# Patient Record
Sex: FEMALE | Race: WHITE | NOT HISPANIC OR LATINO | Employment: FULL TIME | URBAN - METROPOLITAN AREA
[De-identification: names, ages, dates, MRNs, and addresses within clinical notes are randomized per-mention and may not be internally consistent; named-entity substitution may affect disease eponyms.]

---

## 2023-04-08 DIAGNOSIS — K21.9 GASTROESOPHAGEAL REFLUX DISEASE WITHOUT ESOPHAGITIS: Primary | ICD-10-CM

## 2023-04-10 PROBLEM — K21.9 GERD (GASTROESOPHAGEAL REFLUX DISEASE): Status: ACTIVE | Noted: 2023-04-10

## 2023-04-10 RX ORDER — OMEPRAZOLE 40 MG/1
40 CAPSULE, DELAYED RELEASE ORAL DAILY
COMMUNITY
End: 2023-04-26 | Stop reason: SDUPTHER

## 2023-04-10 NOTE — TELEPHONE ENCOUNTER
I called pt and left a message for pt to call back   She is due for her physical after 7/25/2023  Please schedule with [facility]

## 2023-04-18 RX ORDER — OMEPRAZOLE 40 MG/1
CAPSULE, DELAYED RELEASE ORAL
Qty: 30 CAPSULE | Refills: 0 | Status: SHIPPED | OUTPATIENT
Start: 2023-04-18

## 2023-04-18 NOTE — TELEPHONE ENCOUNTER
Attempted to contact pt multiple times to schedule an appointment for refill. Please decline refill request until appointment is scheduled.

## 2023-04-24 PROBLEM — M89.8X9 BONE PAIN: Status: ACTIVE | Noted: 2023-04-24

## 2023-04-24 PROBLEM — R07.9 CHEST PAIN: Status: ACTIVE | Noted: 2023-04-24

## 2023-04-24 PROBLEM — E78.5 HYPERLIPIDEMIA: Status: ACTIVE | Noted: 2023-04-24

## 2023-04-24 PROBLEM — G47.00 INSOMNIA: Status: ACTIVE | Noted: 2023-04-24

## 2023-04-24 PROBLEM — C50.919 BREAST CANCER (MULTI): Status: ACTIVE | Noted: 2023-04-24

## 2023-04-24 RX ORDER — LETROZOLE 2.5 MG/1
2.5 TABLET, FILM COATED ORAL DAILY
COMMUNITY
Start: 2021-09-21

## 2023-04-24 RX ORDER — ACETAMINOPHEN 500 MG
500 TABLET ORAL DAILY PRN
COMMUNITY

## 2023-04-25 PROBLEM — C50.812 MALIGNANT NEOPLASM OF OVERLAPPING SITES OF LEFT FEMALE BREAST (MULTI): Status: ACTIVE | Noted: 2019-08-23

## 2023-04-25 RX ORDER — CIDER VINEGAR 300 MG
300 TABLET ORAL DAILY
COMMUNITY

## 2023-04-25 RX ORDER — LORATADINE 10 MG/1
TABLET ORAL
COMMUNITY
Start: 2020-08-12

## 2023-04-25 RX ORDER — IBUPROFEN 200 MG
200 TABLET ORAL AS NEEDED
COMMUNITY

## 2023-04-26 ENCOUNTER — LAB (OUTPATIENT)
Dept: LAB | Facility: LAB | Age: 48
End: 2023-04-26
Payer: COMMERCIAL

## 2023-04-26 ENCOUNTER — OFFICE VISIT (OUTPATIENT)
Dept: PRIMARY CARE | Facility: CLINIC | Age: 48
End: 2023-04-26
Payer: COMMERCIAL

## 2023-04-26 VITALS
BODY MASS INDEX: 26.72 KG/M2 | RESPIRATION RATE: 12 BRPM | HEART RATE: 65 BPM | TEMPERATURE: 97.2 F | HEIGHT: 64 IN | WEIGHT: 156.5 LBS | DIASTOLIC BLOOD PRESSURE: 69 MMHG | SYSTOLIC BLOOD PRESSURE: 111 MMHG | OXYGEN SATURATION: 98 %

## 2023-04-26 DIAGNOSIS — Z11.59 NEED FOR HEPATITIS B SCREENING TEST: ICD-10-CM

## 2023-04-26 DIAGNOSIS — K21.9 GASTROESOPHAGEAL REFLUX DISEASE WITHOUT ESOPHAGITIS: Primary | ICD-10-CM

## 2023-04-26 DIAGNOSIS — Z00.00 WELLNESS EXAMINATION: ICD-10-CM

## 2023-04-26 DIAGNOSIS — R10.84 GENERALIZED ABDOMINAL PAIN: ICD-10-CM

## 2023-04-26 DIAGNOSIS — Z11.59 NEED FOR HEPATITIS C SCREENING TEST: ICD-10-CM

## 2023-04-26 DIAGNOSIS — R10.2 SUPRAPUBIC ABDOMINAL PAIN: ICD-10-CM

## 2023-04-26 DIAGNOSIS — Z11.4 ENCOUNTER FOR SCREENING FOR HIV: ICD-10-CM

## 2023-04-26 DIAGNOSIS — Z17.0 MALIGNANT NEOPLASM OF OVERLAPPING SITES OF LEFT BREAST IN FEMALE, ESTROGEN RECEPTOR POSITIVE (MULTI): ICD-10-CM

## 2023-04-26 DIAGNOSIS — C50.812 MALIGNANT NEOPLASM OF OVERLAPPING SITES OF LEFT BREAST IN FEMALE, ESTROGEN RECEPTOR POSITIVE (MULTI): ICD-10-CM

## 2023-04-26 DIAGNOSIS — R14.0 ABDOMINAL BLOATING: ICD-10-CM

## 2023-04-26 DIAGNOSIS — E78.2 MIXED HYPERLIPIDEMIA: ICD-10-CM

## 2023-04-26 PROBLEM — M89.8X9 BONE PAIN: Status: RESOLVED | Noted: 2023-04-24 | Resolved: 2023-04-26

## 2023-04-26 PROBLEM — C50.919 BREAST CANCER (MULTI): Status: RESOLVED | Noted: 2023-04-24 | Resolved: 2023-04-26

## 2023-04-26 PROBLEM — G47.00 INSOMNIA: Status: RESOLVED | Noted: 2023-04-24 | Resolved: 2023-04-26

## 2023-04-26 PROBLEM — R07.9 CHEST PAIN: Status: RESOLVED | Noted: 2023-04-24 | Resolved: 2023-04-26

## 2023-04-26 LAB
ALANINE AMINOTRANSFERASE (SGPT) (U/L) IN SER/PLAS: 69 U/L (ref 7–45)
ALBUMIN (G/DL) IN SER/PLAS: 4.7 G/DL (ref 3.4–5)
ALKALINE PHOSPHATASE (U/L) IN SER/PLAS: 85 U/L (ref 33–110)
AMYLASE (U/L) IN SER/PLAS: 26 U/L (ref 29–103)
ANION GAP IN SER/PLAS: 12 MMOL/L (ref 10–20)
APPEARANCE, URINE: CLEAR
ASPARTATE AMINOTRANSFERASE (SGOT) (U/L) IN SER/PLAS: 48 U/L (ref 9–39)
BILIRUBIN TOTAL (MG/DL) IN SER/PLAS: 0.5 MG/DL (ref 0–1.2)
BILIRUBIN, URINE: NEGATIVE
BLOOD, URINE: NEGATIVE
CALCIUM (MG/DL) IN SER/PLAS: 9.8 MG/DL (ref 8.6–10.6)
CARBON DIOXIDE, TOTAL (MMOL/L) IN SER/PLAS: 29 MMOL/L (ref 21–32)
CHLORIDE (MMOL/L) IN SER/PLAS: 102 MMOL/L (ref 98–107)
CHOLESTEROL (MG/DL) IN SER/PLAS: 225 MG/DL (ref 0–199)
CHOLESTEROL IN HDL (MG/DL) IN SER/PLAS: 71.2 MG/DL
CHOLESTEROL/HDL RATIO: 3.2
COLOR, URINE: YELLOW
CREATININE (MG/DL) IN SER/PLAS: 0.77 MG/DL (ref 0.5–1.05)
ERYTHROCYTE DISTRIBUTION WIDTH (RATIO) BY AUTOMATED COUNT: 12.8 % (ref 11.5–14.5)
ERYTHROCYTE MEAN CORPUSCULAR HEMOGLOBIN CONCENTRATION (G/DL) BY AUTOMATED: 32.3 G/DL (ref 32–36)
ERYTHROCYTE MEAN CORPUSCULAR VOLUME (FL) BY AUTOMATED COUNT: 93 FL (ref 80–100)
ERYTHROCYTES (10*6/UL) IN BLOOD BY AUTOMATED COUNT: 4.9 X10E12/L (ref 4–5.2)
GFR FEMALE: >90 ML/MIN/1.73M2
GLUCOSE (MG/DL) IN SER/PLAS: 91 MG/DL (ref 74–99)
GLUCOSE, URINE: NEGATIVE MG/DL
HEMATOCRIT (%) IN BLOOD BY AUTOMATED COUNT: 45.8 % (ref 36–46)
HEMOGLOBIN (G/DL) IN BLOOD: 14.8 G/DL (ref 12–16)
HEPATITIS B VIRUS SURFACE AB (MIU/ML) IN SERUM: <3.1 MIU/ML
HEPATITIS C VIRUS AB PRESENCE IN SERUM: NONREACTIVE
HIV 1/ 2 AG/AB SCREEN: NONREACTIVE
KETONES, URINE: NEGATIVE MG/DL
LDL: 136 MG/DL (ref 0–99)
LEUKOCYTE ESTERASE, URINE: NEGATIVE
LEUKOCYTES (10*3/UL) IN BLOOD BY AUTOMATED COUNT: 4.7 X10E9/L (ref 4.4–11.3)
LIPASE (U/L) IN SER/PLAS: 34 U/L (ref 9–82)
NITRITE, URINE: NEGATIVE
NRBC (PER 100 WBCS) BY AUTOMATED COUNT: 0 /100 WBC (ref 0–0)
PH, URINE: 6 (ref 5–8)
PLATELETS (10*3/UL) IN BLOOD AUTOMATED COUNT: 203 X10E9/L (ref 150–450)
POTASSIUM (MMOL/L) IN SER/PLAS: 4.1 MMOL/L (ref 3.5–5.3)
PROTEIN TOTAL: 7.6 G/DL (ref 6.4–8.2)
PROTEIN, URINE: NEGATIVE MG/DL
SODIUM (MMOL/L) IN SER/PLAS: 139 MMOL/L (ref 136–145)
SPECIFIC GRAVITY, URINE: 1.02 (ref 1–1.03)
TRIGLYCERIDE (MG/DL) IN SER/PLAS: 90 MG/DL (ref 0–149)
UREA NITROGEN (MG/DL) IN SER/PLAS: 14 MG/DL (ref 6–23)
UROBILINOGEN, URINE: <2 MG/DL (ref 0–1.9)
VLDL: 18 MG/DL (ref 0–40)

## 2023-04-26 PROCEDURE — 80061 LIPID PANEL: CPT

## 2023-04-26 PROCEDURE — 80053 COMPREHEN METABOLIC PANEL: CPT

## 2023-04-26 PROCEDURE — 83690 ASSAY OF LIPASE: CPT

## 2023-04-26 PROCEDURE — 85027 COMPLETE CBC AUTOMATED: CPT

## 2023-04-26 PROCEDURE — 87389 HIV-1 AG W/HIV-1&-2 AB AG IA: CPT

## 2023-04-26 PROCEDURE — 86706 HEP B SURFACE ANTIBODY: CPT

## 2023-04-26 PROCEDURE — 86803 HEPATITIS C AB TEST: CPT

## 2023-04-26 PROCEDURE — 36415 COLL VENOUS BLD VENIPUNCTURE: CPT

## 2023-04-26 PROCEDURE — 99396 PREV VISIT EST AGE 40-64: CPT | Performed by: FAMILY MEDICINE

## 2023-04-26 PROCEDURE — 82150 ASSAY OF AMYLASE: CPT

## 2023-04-26 PROCEDURE — 81003 URINALYSIS AUTO W/O SCOPE: CPT

## 2023-04-26 PROCEDURE — 1036F TOBACCO NON-USER: CPT | Performed by: FAMILY MEDICINE

## 2023-04-26 RX ORDER — PLANT STANOL ESTER 450 MG
TABLET ORAL
COMMUNITY

## 2023-04-26 RX ORDER — NAPROXEN SODIUM 220 MG/1
1 TABLET ORAL DAILY
COMMUNITY

## 2023-04-26 NOTE — ASSESSMENT & PLAN NOTE
Encouraged to check records about tetanus vaccine.  Ordered hepatitis B titer test.  Ordered general wellness blood work.    Hearing test:  Recommended to have hearing test to assess hearing loss.  Discussed that untreated hearing loss increases risk for dementia.  Referred to audiologist.

## 2023-04-26 NOTE — PROGRESS NOTES
"Subjective   Patient ID: Dipti Patino is a 48 y.o. female who presents for annual physical/wellness visit.    She signed document informing that if problem issues also address at today's wellness visit that insurance may be appropriately billed so co-pay and deductible out of pocket expenses may occur.    Colorectal cancer screen: 12/22/2020  Mammogram:  10/31/2022   Pap: per patient [facility] to [facility] ( [facility])   Last Menstrual Period:   12/2019   Tdap:  HIV screen:  Hepatitis C screen:  Hepatitis B vaccine:    HPI   Mammogram on left side showed probably benign probable postsurgical changes in the left mastectomy bed. She will be due for 6 months follow up. If follow up mammogram remains stable, she will return to yearly mammograms.     Patient c/o abdominal bloating and discomfort. Ongoing over the past 3 weeks. Symptoms have been variable. She describes them as similar to mild, dull menstrual cramps. She felt feeling of discomfort on the right side of abdomen that she states was similar to when she was 14-16 weeks pregnant. It lasted only one day. This episode occurred about 2 weeks ago. Endorses urinary urgency. Denies dysuria or change in bowels or nausea.     Patient endorses difficulty hearing in crowded spaces when there are multiple sources of sound.    Follows with dentist and optometrist regularly.    History of salpingectomy without uterus removal.    Review of Systems  constitutional: as per HPI  eyes:as per HPI  CV:as per HPI  Respiratory:as per HPI  GI:as per HPI  neuro:as per HPI  endo:as per HPI  heme/lymph:as per HPI     Objective   /69 (BP Location: Right arm, Patient Position: Sitting, BP Cuff Size: Adult)   Pulse 65   Temp 36.2 °C (97.2 °F) (Temporal)   Resp 12   Ht 1.626 m (5' 4\")   Wt 71 kg (156 lb 8 oz)   LMP  (LMP Unknown)   SpO2 98%   BMI 26.86 kg/m²     Physical Exam  Constitutional:       Appearance: Normal appearance. She is overweight.   HENT:      Head: " Normocephalic.      Right Ear: Tympanic membrane, ear canal and external ear normal.      Left Ear: Tympanic membrane, ear canal and external ear normal.   Eyes:      Conjunctiva/sclera: Conjunctivae normal.      Pupils: Pupils are equal, round, and reactive to light.   Cardiovascular:      Rate and Rhythm: Normal rate and regular rhythm.   Pulmonary:      Effort: Pulmonary effort is normal.      Breath sounds: Normal breath sounds.   Abdominal:      General: Abdomen is flat. Bowel sounds are normal.      Palpations: Abdomen is soft.      Tenderness: There is abdominal tenderness in the right lower quadrant and suprapubic area.   Musculoskeletal:         General: Normal range of motion.   Skin:     General: Skin is warm.   Neurological:      Mental Status: She is alert and oriented to person, place, and time.   Psychiatric:         Mood and Affect: Mood normal.         Behavior: Behavior normal.         Thought Content: Thought content normal.         Judgment: Judgment normal.         Assessment/Plan   Problem List Items Addressed This Visit          Digestive    Gastroesophageal reflux disease without esophagitis - Primary     Stable.  Continue omeprazole 40 mg daily.            Other    Mixed hyperlipidemia     Stable.  Continue plant stanol supplement.  Ordered lipid panel.         Malignant neoplasm of overlapping sites of left breast in female, estrogen receptor positive (CMS/HCC)     Currently treating with Femara 2.5 mg daily.  Recent mammogram showed left side probably benign probable postsurgical changes in the left mastectomy bed.  Will be due for repeat breast ultrasound after 6 months.         Wellness examination     Encouraged to check records about tetanus vaccine.  Ordered hepatitis B titer test.  Ordered general wellness blood work.    Hearing test:  Recommended to have hearing test to assess hearing loss.  Discussed that untreated hearing loss increases risk for dementia.  Referred to  "audiologist.          Other Visit Diagnoses       Encounter for screening for HIV        Need for hepatitis C screening test        Generalized abdominal pain        Ongoing for 3 weeks.  Urine screen completed today.  Ordered abdominal ultrasound.              Tips for your general wellness...;  Remember importance of daily aerobic exercise for 30minutes to help with both your physical and mental/emotional health.  ;  Take time twice a day to relax with focused breathing and relaxation.  A good source to learn \"mindfulness\" relaxation is a book \"Mindfulness for Beginners\" by Jakob Grover.  ;    Strive for healthy eating with plenty of water, fruits, vegetables, and choosing as much plant based diet as able  If do consume non plant protein, choose fish high in omega (like salmon or cod), skinless poultry, and rarely anything from a cow  Avoid processed foods.  ;  Shop the perimeter of the grocery store  - not the inner aisles where all the boxed, bagged, and canned process non natural foods are   Avoid sugar - including fruit juices with added sugar and avoid artificial sweeteners (sucralose, aspartame).; if want to use sweetener, use stevia (natural plant based non caloric sweetener)  Recommended guided nutrition plans include Mediterranean Diet - online resources available     Get plenty of sleep nightly - 7 hours minimum;    Exercise 150min per week;    Do not use tobacco    Abstain or limit alcohol to 1-2 drinks per 24 hours    See your dentist at least yearly    Have an eye check at least every 5 years    Follow up 1 year for annual wellness checkup;    By signing my name below IWade scribe, [place] that this documentation has been prepared under the direction and in the presence of Dr. Adarsh Rueda. 04/26/23   "

## 2023-04-26 NOTE — ASSESSMENT & PLAN NOTE
Oncologist Dr Oliver  Currently treating with Femara 2.5 mg daily.  Recent mammogram showed left side probably benign probable postsurgical changes in the left mastectomy bed with reduced size from prior   Will be due for repeat breast ultrasound after 6 months.

## 2023-04-28 ENCOUNTER — TELEPHONE (OUTPATIENT)
Dept: PRIMARY CARE | Facility: CLINIC | Age: 48
End: 2023-04-28
Payer: COMMERCIAL

## 2023-04-28 DIAGNOSIS — H91.90 HEARING LOSS, UNSPECIFIED HEARING LOSS TYPE, UNSPECIFIED LATERALITY: Primary | ICD-10-CM

## 2023-04-28 NOTE — TELEPHONE ENCOUNTER
Patient called requesting referral for audiology, states previously discussed at recent appt. Please return call at [phone].

## 2023-05-01 NOTE — TELEPHONE ENCOUNTER
Apologize for me that I forgot to do this at appointment as we discussed    Referral is now in     Send patricia message to schedule    Thanks

## 2023-05-01 NOTE — TELEPHONE ENCOUNTER
Pt calling wanted to let you know that on Friday afternoon she noticed that she was having blood in her urine and also spotting that has lasted over the weekend and today. She stated that it's like tissue looking on Sunday and today with abd cramps and low back like a menstrual cycle where she has to wear a pad.   Pt wanted you to be aware since she just had blood work done. Pt said ok to wait till you see this tomorrow.

## 2023-05-02 ENCOUNTER — TELEPHONE (OUTPATIENT)
Dept: PRIMARY CARE | Facility: CLINIC | Age: 48
End: 2023-05-02

## 2023-05-02 DIAGNOSIS — R10.2 PELVIC PAIN: Primary | ICD-10-CM

## 2023-05-02 DIAGNOSIS — N83.201 CYST OF RIGHT OVARY: ICD-10-CM

## 2023-05-02 DIAGNOSIS — N29 NEPHROCALCINOSIS: ICD-10-CM

## 2023-05-02 DIAGNOSIS — N93.9 ABNORMAL UTERINE BLEEDING: ICD-10-CM

## 2023-05-02 DIAGNOSIS — E83.59 NEPHROCALCINOSIS: ICD-10-CM

## 2023-05-02 DIAGNOSIS — R74.8 ELEVATED LIVER ENZYMES: ICD-10-CM

## 2023-05-02 NOTE — TELEPHONE ENCOUNTER
Labs not show anemia or any explainable cause for abd oain and now these symptoms    Ultrasound abd was ordered-  please confirm scheduled  As this is next test needed

## 2023-05-02 NOTE — TELEPHONE ENCOUNTER
The patient has been scheduled. She has questions about the abdomin imaging that was just done and what was seen with the Kidneys. She is asking if the provider could look at this and elaborate.  Please call her at her cellphone.

## 2023-05-23 ENCOUNTER — LAB (OUTPATIENT)
Dept: LAB | Facility: LAB | Age: 48
End: 2023-05-23
Payer: COMMERCIAL

## 2023-05-23 DIAGNOSIS — R74.8 ELEVATED LIVER ENZYMES: ICD-10-CM

## 2023-05-23 PROCEDURE — 80076 HEPATIC FUNCTION PANEL: CPT

## 2023-05-23 PROCEDURE — 36415 COLL VENOUS BLD VENIPUNCTURE: CPT

## 2023-05-24 LAB
ALANINE AMINOTRANSFERASE (SGPT) (U/L) IN SER/PLAS: 15 U/L (ref 7–45)
ALBUMIN (G/DL) IN SER/PLAS: 4.4 G/DL (ref 3.4–5)
ALKALINE PHOSPHATASE (U/L) IN SER/PLAS: 64 U/L (ref 33–110)
ASPARTATE AMINOTRANSFERASE (SGOT) (U/L) IN SER/PLAS: 20 U/L (ref 9–39)
BILIRUBIN DIRECT (MG/DL) IN SER/PLAS: 0.1 MG/DL (ref 0–0.3)
BILIRUBIN TOTAL (MG/DL) IN SER/PLAS: 0.7 MG/DL (ref 0–1.2)
PROTEIN TOTAL: 7.1 G/DL (ref 6.4–8.2)

## 2024-01-05 ENCOUNTER — PATIENT MESSAGE (OUTPATIENT)
Dept: PRIMARY CARE | Facility: CLINIC | Age: 49
End: 2024-01-05
Payer: COMMERCIAL

## 2024-01-05 DIAGNOSIS — R05.2 SUBACUTE COUGH: Primary | ICD-10-CM

## 2024-01-05 RX ORDER — BENZONATATE 100 MG/1
100 CAPSULE ORAL 3 TIMES DAILY PRN
Qty: 42 CAPSULE | Refills: 0 | Status: SHIPPED | OUTPATIENT
Start: 2024-01-05 | End: 2024-02-04

## 2024-05-01 ENCOUNTER — APPOINTMENT (OUTPATIENT)
Dept: RADIOLOGY | Facility: CLINIC | Age: 49
End: 2024-05-01
Payer: COMMERCIAL

## 2024-08-22 ENCOUNTER — APPOINTMENT (OUTPATIENT)
Dept: PRIMARY CARE | Facility: CLINIC | Age: 49
End: 2024-08-22
Payer: COMMERCIAL

## 2024-08-22 VITALS
HEART RATE: 72 BPM | TEMPERATURE: 97 F | RESPIRATION RATE: 12 BRPM | SYSTOLIC BLOOD PRESSURE: 112 MMHG | WEIGHT: 153.5 LBS | HEIGHT: 64 IN | OXYGEN SATURATION: 97 % | BODY MASS INDEX: 26.21 KG/M2 | DIASTOLIC BLOOD PRESSURE: 71 MMHG

## 2024-08-22 DIAGNOSIS — Z90.722 H/O BILATERAL OOPHORECTOMY: ICD-10-CM

## 2024-08-22 DIAGNOSIS — Z00.00 WELLNESS EXAMINATION: Primary | ICD-10-CM

## 2024-08-22 DIAGNOSIS — Z17.0 MALIGNANT NEOPLASM OF OVERLAPPING SITES OF LEFT BREAST IN FEMALE, ESTROGEN RECEPTOR POSITIVE (MULTI): ICD-10-CM

## 2024-08-22 DIAGNOSIS — K21.9 GASTROESOPHAGEAL REFLUX DISEASE WITHOUT ESOPHAGITIS: ICD-10-CM

## 2024-08-22 DIAGNOSIS — E78.2 MIXED HYPERLIPIDEMIA: ICD-10-CM

## 2024-08-22 DIAGNOSIS — C50.812 MALIGNANT NEOPLASM OF OVERLAPPING SITES OF LEFT BREAST IN FEMALE, ESTROGEN RECEPTOR POSITIVE (MULTI): ICD-10-CM

## 2024-08-22 PROCEDURE — 3008F BODY MASS INDEX DOCD: CPT | Performed by: FAMILY MEDICINE

## 2024-08-22 PROCEDURE — 99396 PREV VISIT EST AGE 40-64: CPT | Performed by: FAMILY MEDICINE

## 2024-08-22 ASSESSMENT — ENCOUNTER SYMPTOMS
NEUROLOGICAL NEGATIVE: 1
PSYCHIATRIC NEGATIVE: 1
COUGH: 1
GASTROINTESTINAL NEGATIVE: 1
CARDIOVASCULAR NEGATIVE: 1
EYES NEGATIVE: 1
CONSTITUTIONAL NEGATIVE: 1
MUSCULOSKELETAL NEGATIVE: 1
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
## 2024-08-22 NOTE — ASSESSMENT & PLAN NOTE
Oncologist Dr Oliver  Currently treating with Femara 2.5 mg daily.    10/27/2023 - US L breast:  FINDINGS: a stable 1.7 x 0.5 x 0.9 cm hypoechoic mass with fairly circumscribed margins and no vascular flow. This is been unchanged for over two years. This is benign with no further follow-up needed.     10/27/2023 - Mammogram R breast:  FINDINGS: There are no suspicious masses, calcifications, or architectural distortion within the right breast. There is been no significant change from prior exams.

## 2024-08-22 NOTE — ASSESSMENT & PLAN NOTE
Lab Results   Component Value Date    CHOL 225 (H) 04/26/2023    CHOL 188 09/29/2022    CHOL 214 (H) 10/07/2021     Lab Results   Component Value Date    HDL 71.2 04/26/2023    HDL 61.2 09/29/2022    HDL 55.5 10/07/2021     Lab Results   Component Value Date    LDLF 136 (H) 04/26/2023    LDLF 107 (H) 09/29/2022    LDLF 133 (H) 10/07/2021     Lab Results   Component Value Date    TRIG 90 04/26/2023    TRIG 98 09/29/2022    TRIG 127 10/07/2021   Stable.  Continue with supplements.  Order placed for repeat lipid panel.

## 2024-08-26 PROBLEM — Z90.722 H/O BILATERAL OOPHORECTOMY: Status: ACTIVE | Noted: 2024-08-26

## 2024-09-16 ENCOUNTER — APPOINTMENT (OUTPATIENT)
Dept: PRIMARY CARE | Facility: CLINIC | Age: 49
End: 2024-09-16
Payer: COMMERCIAL

## 2024-09-16 ENCOUNTER — OFFICE VISIT (OUTPATIENT)
Dept: PRIMARY CARE | Facility: CLINIC | Age: 49
End: 2024-09-16
Payer: COMMERCIAL

## 2024-09-16 VITALS
HEART RATE: 63 BPM | RESPIRATION RATE: 14 BRPM | WEIGHT: 153 LBS | SYSTOLIC BLOOD PRESSURE: 101 MMHG | TEMPERATURE: 97.7 F | BODY MASS INDEX: 26.26 KG/M2 | DIASTOLIC BLOOD PRESSURE: 66 MMHG | OXYGEN SATURATION: 97 %

## 2024-09-16 DIAGNOSIS — B02.9 HERPES ZOSTER WITHOUT COMPLICATION: Primary | ICD-10-CM

## 2024-09-16 PROBLEM — Z00.00 WELLNESS EXAMINATION: Status: RESOLVED | Noted: 2023-04-26 | Resolved: 2024-09-16

## 2024-09-16 PROCEDURE — 99213 OFFICE O/P EST LOW 20 MIN: CPT | Performed by: FAMILY MEDICINE

## 2024-09-16 PROCEDURE — 1036F TOBACCO NON-USER: CPT | Performed by: FAMILY MEDICINE

## 2024-09-16 RX ORDER — VALACYCLOVIR HYDROCHLORIDE 1 G/1
1000 TABLET, FILM COATED ORAL 3 TIMES DAILY
Qty: 21 TABLET | Refills: 0 | Status: SHIPPED | OUTPATIENT
Start: 2024-09-16 | End: 2024-09-23

## 2024-09-16 RX ORDER — PREDNISONE 20 MG/1
40 TABLET ORAL DAILY
Qty: 10 TABLET | Refills: 0 | Status: SHIPPED | OUTPATIENT
Start: 2024-09-16 | End: 2024-09-21

## 2024-09-16 NOTE — PROGRESS NOTES
Subjective   Patient ID: Dipti Patino is a 49 y.o. female who presents for Rash.    Has a rash on her right arm.   Has a deep itch that is going all over her arm even where there is not a rash.   Rash is only on the right volar arm, no other rash anywhere on her body.   Thinks she might have shingles.   She did have it in 2011 on her right side of her face.   Does remember getting nauseous with one of the meds she was given for nerve pain at that time, does not believe it was the anti-viral medication.  Is sensitive to narcotics.        Objective   /66 (BP Location: Right arm, Patient Position: Sitting, BP Cuff Size: Adult)   Pulse 63   Temp 36.5 °C (97.7 °F) (Temporal)   Resp 14   Wt 69.4 kg (153 lb)   LMP  (LMP Unknown)   SpO2 97%   BMI 26.26 kg/m²     Physical Exam  Clustered vesicles above and below the cubital fossa, a few smaller areas on the lower forearm with individual vesicles in different phases, some crusted, some dried. Redness and swelling around largest patches    Assessment/Plan   Problem List Items Addressed This Visit    None  Visit Diagnoses         Codes    Herpes zoster without complication    -  Primary B02.9    Relevant Medications    valACYclovir (Valtrex) 1 gram tablet    predniSONE (Deltasone) 20 mg tablet        Call if redness or swelling worsens.

## 2025-02-18 ENCOUNTER — APPOINTMENT (OUTPATIENT)
Dept: PRIMARY CARE | Facility: CLINIC | Age: 50
End: 2025-02-18
Payer: COMMERCIAL

## 2025-04-07 DIAGNOSIS — G56.20 ULNAR NEUROPATHY, UNSPECIFIED LATERALITY: Primary | ICD-10-CM

## 2025-06-26 ENCOUNTER — OFFICE VISIT (OUTPATIENT)
Dept: PRIMARY CARE | Facility: CLINIC | Age: 50
End: 2025-06-26
Payer: COMMERCIAL

## 2025-06-26 VITALS
WEIGHT: 153 LBS | DIASTOLIC BLOOD PRESSURE: 65 MMHG | HEART RATE: 59 BPM | TEMPERATURE: 97.8 F | SYSTOLIC BLOOD PRESSURE: 106 MMHG | BODY MASS INDEX: 26.26 KG/M2 | RESPIRATION RATE: 12 BRPM | OXYGEN SATURATION: 97 %

## 2025-06-26 DIAGNOSIS — B02.8 HERPES ZOSTER WITH COMPLICATION: Primary | ICD-10-CM

## 2025-06-26 DIAGNOSIS — L30.9 DERMATITIS: ICD-10-CM

## 2025-06-26 PROCEDURE — 1036F TOBACCO NON-USER: CPT | Performed by: FAMILY MEDICINE

## 2025-06-26 PROCEDURE — 99214 OFFICE O/P EST MOD 30 MIN: CPT | Performed by: FAMILY MEDICINE

## 2025-06-26 RX ORDER — ACYCLOVIR 800 MG/1
800 TABLET ORAL
COMMUNITY
Start: 2025-06-16 | End: 2025-06-26 | Stop reason: ALTCHOICE

## 2025-06-26 RX ORDER — VALACYCLOVIR HYDROCHLORIDE 1 G/1
1000 TABLET, FILM COATED ORAL 3 TIMES DAILY
Qty: 21 TABLET | Refills: 0 | Status: SHIPPED | OUTPATIENT
Start: 2025-06-26 | End: 2025-07-03

## 2025-06-26 RX ORDER — PREDNISONE 10 MG/1
TABLET ORAL
Qty: 30 TABLET | Refills: 0 | Status: SHIPPED | OUTPATIENT
Start: 2025-06-26 | End: 2025-07-06

## 2025-06-26 ASSESSMENT — ENCOUNTER SYMPTOMS
NUMBNESS: 0
FATIGUE: 0
FEVER: 0
CHILLS: 0

## 2025-06-26 NOTE — PROGRESS NOTES
Subjective   Patient ID: Dipti Patino is a 50 y.o. female who presents for Rash (Pt presents for rash right side of body, hand, wrist forearm, back, flank, face, itching, started June 14. Went to an urgent care - prescribed Acyclovir, Prednisone.).    HPI     She was in urgent care 6/16/25 for rash on right forearm- diagnosed with shingles.  Patient states that she does have a history of recurrent shingles and that she developed a similar rash to her right arm in September 2024. Patient was treated with both valtrex and prednisone at that time and reports that she experienced complete resolution of her symptoms.  She was treated with acyclovir and prednisone (3 day course) on 6/16/25.  Symptoms started to feel better initially.    Since Monday- rash on right flank and low back- few red spots.  Denies new meds.  No exposure to poison ivy.  Does have some mosquitoes bites on legs, but spots on back are different.  Very itchy.  Denies pain or burning.      Current Medications[1]    Review of Systems   Constitutional:  Negative for chills, fatigue and fever.   Skin:  Positive for rash.   Neurological:  Negative for numbness.           Objective   /65 (BP Location: Left arm, Patient Position: Sitting, BP Cuff Size: Adult)   Pulse 59   Temp 36.6 °C (97.8 °F) (Temporal)   Resp 12   Wt 69.4 kg (153 lb)   LMP  (LMP Unknown)   SpO2 97%   BMI 26.26 kg/m²     Physical Exam    Constitutional: Well developed, well nourished, alert and in no acute distress   Eyes: Normal external exam.   Skin: Warm, well perfused.  Few erythematous papules right low back, right flank.  Linear papular rash right forearm in C5-6 dermatome.  No vesicles.    Neurologic: Cranial nerves II-XII grossly intact.   Psychiatric: Mood calm and affect normal.      Assessment/Plan     Problem List Items Addressed This Visit    None  Visit Diagnoses         Herpes zoster with complication    -  Primary    Relevant Medications    valACYclovir  (Valtrex) 1 gram tablet      Dermatitis        Relevant Medications    predniSONE (Deltasone) 10 mg tablet        Unusual case.  Initially treated with Acyclovir for presumed herpes zoster of right forearm in C5-6 dermatome.    Now with new dermatomal rash right low back / flank T11-12.   Stop acyclovir. Switch to valtrex.    Prednisone taper x 10 days to help with itching.   Continue zyrtec daily.   Add benadryl at bedtime.     Return as needed.      Jimena Servin DO  6/26/2025         [1]   Current Outpatient Medications   Medication Sig Dispense Refill    acetaminophen (Tylenol) 500 mg tablet Take 1 tablet (500 mg) by mouth once daily as needed.      acyclovir (Zovirax) 800 mg tablet Take 1 tablet (800 mg) by mouth 5 times a day.      calcium citrate-vitamin D2 250 mg-2.5 mcg (100 unit) tablet Take 1 tablet by mouth once daily.      ibuprofen 200 mg tablet Take 1 tablet (200 mg) by mouth if needed.      letrozole (Femara) 2.5 mg tablet Take 1 tablet (2.5 mg total) by mouth once daily.      loratadine (Claritin) 10 mg tablet Loratadine CLARITIN 10 MG TABS one tablet daily 2020/08/12 LORATADINE [id number] Daniel Hess MD 08-  [facility] [place] [facility] ([id number])      predniSONE (Deltasone) 10 mg tablet Take 5 tablets (50 mg) by mouth once daily for 2 days, THEN 4 tablets (40 mg) once daily for 2 days, THEN 3 tablets (30 mg) once daily for 2 days, THEN 2 tablets (20 mg) once daily for 2 days, THEN 1 tablet (10 mg) once daily for 2 days. Take in morning with food. 30 tablet 0    valACYclovir (Valtrex) 1 gram tablet Take 1 tablet (1,000 mg) by mouth 3 times a day for 7 days. 21 tablet 0     No current facility-administered medications for this visit.

## 2025-07-09 DIAGNOSIS — L30.9 DERMATITIS: Primary | ICD-10-CM

## 2025-07-09 RX ORDER — PREDNISONE 5 MG/1
TABLET ORAL
Qty: 100 TABLET | Refills: 0 | Status: SHIPPED | OUTPATIENT
Start: 2025-07-09 | End: 2025-07-09 | Stop reason: SDUPTHER

## 2025-07-09 RX ORDER — PREDNISONE 5 MG/1
TABLET ORAL
Qty: 100 TABLET | Refills: 0 | Status: SHIPPED | OUTPATIENT
Start: 2025-07-09

## 2025-07-15 ENCOUNTER — APPOINTMENT (OUTPATIENT)
Dept: PRIMARY CARE | Facility: CLINIC | Age: 50
End: 2025-07-15
Payer: COMMERCIAL

## 2025-08-05 ENCOUNTER — APPOINTMENT (OUTPATIENT)
Dept: PRIMARY CARE | Facility: CLINIC | Age: 50
End: 2025-08-05
Payer: COMMERCIAL

## 2025-08-05 VITALS
HEIGHT: 64 IN | SYSTOLIC BLOOD PRESSURE: 111 MMHG | HEART RATE: 67 BPM | RESPIRATION RATE: 14 BRPM | BODY MASS INDEX: 26.26 KG/M2 | TEMPERATURE: 98.8 F | OXYGEN SATURATION: 96 % | DIASTOLIC BLOOD PRESSURE: 70 MMHG

## 2025-08-05 DIAGNOSIS — G56.22 CUBITAL TUNNEL SYNDROME ON LEFT: ICD-10-CM

## 2025-08-05 DIAGNOSIS — Z23 NEED FOR TETANUS, DIPHTHERIA, AND ACELLULAR PERTUSSIS (TDAP) VACCINE: ICD-10-CM

## 2025-08-05 DIAGNOSIS — Z80.0 FAMILY HISTORY OF COLON CANCER: ICD-10-CM

## 2025-08-05 DIAGNOSIS — E78.2 MIXED HYPERLIPIDEMIA: Primary | ICD-10-CM

## 2025-08-05 DIAGNOSIS — K21.9 GASTROESOPHAGEAL REFLUX DISEASE WITHOUT ESOPHAGITIS: ICD-10-CM

## 2025-08-05 DIAGNOSIS — C50.812 MALIGNANT NEOPLASM OF OVERLAPPING SITES OF LEFT BREAST IN FEMALE, ESTROGEN RECEPTOR POSITIVE: ICD-10-CM

## 2025-08-05 DIAGNOSIS — Z00.00 WELLNESS EXAMINATION: ICD-10-CM

## 2025-08-05 DIAGNOSIS — Z23 NEED FOR SHINGLES VACCINE: ICD-10-CM

## 2025-08-05 DIAGNOSIS — Z17.0 MALIGNANT NEOPLASM OF OVERLAPPING SITES OF LEFT BREAST IN FEMALE, ESTROGEN RECEPTOR POSITIVE: ICD-10-CM

## 2025-08-05 PROCEDURE — 99396 PREV VISIT EST AGE 40-64: CPT | Performed by: FAMILY MEDICINE

## 2025-08-05 ASSESSMENT — ENCOUNTER SYMPTOMS
RESPIRATORY NEGATIVE: 1
CARDIOVASCULAR NEGATIVE: 1
PSYCHIATRIC NEGATIVE: 1
GASTROINTESTINAL NEGATIVE: 1
NEUROLOGICAL NEGATIVE: 1

## 2025-08-05 ASSESSMENT — PATIENT HEALTH QUESTIONNAIRE - PHQ9
1. LITTLE INTEREST OR PLEASURE IN DOING THINGS: NOT AT ALL
2. FEELING DOWN, DEPRESSED OR HOPELESS: NOT AT ALL
SUM OF ALL RESPONSES TO PHQ9 QUESTIONS 1 AND 2: 0

## 2025-08-05 NOTE — ASSESSMENT & PLAN NOTE
Oncologist Dr Oliver  Currently treating with Femara 2.5 mg daily.    10/28/2024 Mammogram:  COMPARISON: 6/21/2021, 10/31/2022, 10/27/2023     FINDINGS: No suspicious masses, architectural distortions or suspiciously clustered microcalcifications are identified.  There is no evidence of skin thickening or nipple retraction.      There are no significant changes when compared with prior studies.

## 2025-08-05 NOTE — PROGRESS NOTES
"Subjective   Patient ID: Dipti Patino is a 50 y.o. female who presents for annual physical/wellness visit.    she signed document informing that if problem issues also address at today's wellness visit that insurance may be appropriately billed so co-pay and deductible out of pocket expenses may occur.    Colorectal cancer screen: 12/22/2020  Mammogram: 10/28/2024  Tdap: no record  HIV screen: 4/26/2023  Hepatitis C screen: 4/26/2023  Hepatitis B vaccine: never done    She experienced recent shingles spots and also poison ivy at the same time. After being treated she is doing much better now. She is interested in the varicella vaccine. She also has no record of a Tdap vaccine, she feels like she had one within the last decade but she is agreeable to vaccinating again.   She has established with jazmín Baltazar, at the [facility] for left elbow numbness and pain. This was brought on by a fall at work in February when she slipped on ice and landed directly on the elbow. They are planning surgical intervention within the next few months and she will have a follow up with the same physician after.  She does experience urinary urgency and stress incontinence. It is manageable currently, she is not interested in medication or therapy at this time.          Review of Systems   HENT: Negative.     Respiratory: Negative.     Cardiovascular: Negative.    Gastrointestinal: Negative.    Genitourinary:  Positive for urgency (+ stress incontinence).   Musculoskeletal:         + L elbow pain   Neurological: Negative.    Psychiatric/Behavioral: Negative.         Objective   /70 (BP Location: Right arm, Patient Position: Sitting, BP Cuff Size: Adult)   Pulse 67   Temp 37.1 °C (98.8 °F) (Temporal)   Resp 14   Ht 1.626 m (5' 4\")   LMP  (LMP Unknown)   SpO2 96%   BMI 26.26 kg/m²     Physical Exam  Constitutional:       Appearance: Normal appearance. She is overweight.   HENT:      Head: Normocephalic.      " Right Ear: Tympanic membrane normal.      Left Ear: Tympanic membrane normal.      Nose: Nose normal.      Mouth/Throat:      Pharynx: Oropharynx is clear.     Eyes:      Conjunctiva/sclera: Conjunctivae normal.       Cardiovascular:      Rate and Rhythm: Normal rate and regular rhythm.      Pulses: Normal pulses.      Heart sounds: Normal heart sounds.   Pulmonary:      Effort: Pulmonary effort is normal.      Breath sounds: Normal breath sounds.   Abdominal:      General: Abdomen is flat. Bowel sounds are normal.      Palpations: Abdomen is soft.     Musculoskeletal:      Cervical back: Normal range of motion.     Neurological:      General: No focal deficit present.      Mental Status: She is alert.     Psychiatric:         Mood and Affect: Mood normal.         Behavior: Behavior normal.         Thought Content: Thought content normal.         Judgment: Judgment normal.         Assessment/Plan   Problem List Items Addressed This Visit       Gastroesophageal reflux disease without esophagitis    Stable w/o medication         Mixed hyperlipidemia - Primary    Lab Results   Component Value Date    CHOL 225 (H) 04/26/2023    CHOL 188 09/29/2022    CHOL 214 (H) 10/07/2021     Lab Results   Component Value Date    HDL 71.2 04/26/2023    HDL 61.2 09/29/2022    HDL 55.5 10/07/2021     Lab Results   Component Value Date    TRIG 90 04/26/2023    TRIG 98 09/29/2022    TRIG 127 10/07/2021   Stable.  Continue with supplements.         Malignant neoplasm of overlapping sites of left breast in female, estrogen receptor positive    Oncologist Dr Oliver  Currently treating with Femara 2.5 mg daily.    10/28/2024 Mammogram:  COMPARISON: 6/21/2021, 10/31/2022, 10/27/2023     FINDINGS: No suspicious masses, architectural distortions or suspiciously clustered microcalcifications are identified.  There is no evidence of skin thickening or nipple retraction.      There are no significant changes when compared with prior studies.           "Wellness examination    Relevant Orders    CBC    Lipid Panel    Comprehensive Metabolic Panel    Cubital tunnel syndrome on left    Established w/ Dr. Hill, [facility].  Sxs stemmed from fall on ice 2/2025, work related.  Surgical intervention discussed and planned.          Other Visit Diagnoses         Need for tetanus, diphtheria, and acellular pertussis (Tdap) vaccine        Relevant Orders    Tdap vaccine, age 7 years and older      Family history of colon cancer        Relevant Orders    Colonoscopy Screening; High Risk Patient      Need for shingles vaccine        Relevant Orders    Zoster vaccine, recombinant, adult (SHINGRIX)          Follow up: Scheduled 8/5/2026    Tips for your general wellness...;  Remember importance of daily aerobic exercise for 30minutes to help with both your physical and mental/emotional health.  ;  Take time twice a day to relax with focused breathing and relaxation.  A good source to learn \"mindfulness\" relaxation is a book \"Mindfulness for Beginners\" by Jakob Grover.  ;    Strive for healthy eating with plenty of water, fruits, vegetables, and choosing as much plant based diet as able  If do consume non plant protein, choose fish high in omega (like salmon or cod), skinless poultry, and rarely anything from a cow  Avoid processed foods.  ;  Shop the perimeter of the grocery store  - not the inner aisles where all the boxed, bagged, and canned process non natural foods are   Avoid sugar - including fruit juices with added sugar and avoid artificial sweeteners (sucralose, aspartame).; if want to use sweetener, use stevia (natural plant based non caloric sweetener)  Recommended guided nutrition plans include Mediterranean Diet - online resources available     Get plenty of sleep nightly - 7 hours minimum;    Exercise 150min per week;    Do not use tobacco    Abstain or limit alcohol to 1-2 drinks per 24 hours    See your dentist at least yearly    Have an eye check at " least every 5 years    Follow up 1 year for annual wellness checkup;    Scribe Attestation  By signing my name below, I, Bhargav Lara   attest that this documentation has been prepared under the direction and in the presence of Adarsh Rueda MD.

## 2025-08-05 NOTE — PATIENT INSTRUCTIONS
[facility]  [address]    [phone]  CALL TO HAVE 5 YEAR INTERVAL COLONOSCOPY SCHEDULED PER DR WIGGINS RECOMMENDATIONS AT 2020 EXAM

## 2025-08-05 NOTE — ASSESSMENT & PLAN NOTE
Established w/ Dr. Hill, [facility].  Sxs stemmed from fall on ice 2/2025, work related.  Surgical intervention discussed and planned.

## 2025-08-05 NOTE — ASSESSMENT & PLAN NOTE
Lab Results   Component Value Date    CHOL 225 (H) 04/26/2023    CHOL 188 09/29/2022    CHOL 214 (H) 10/07/2021     Lab Results   Component Value Date    HDL 71.2 04/26/2023    HDL 61.2 09/29/2022    HDL 55.5 10/07/2021     Lab Results   Component Value Date    TRIG 90 04/26/2023    TRIG 98 09/29/2022    TRIG 127 10/07/2021   Stable.  Continue with supplements.

## 2025-08-19 ENCOUNTER — APPOINTMENT (OUTPATIENT)
Dept: PRIMARY CARE | Facility: CLINIC | Age: 50
End: 2025-08-19
Payer: COMMERCIAL

## 2026-08-05 ENCOUNTER — APPOINTMENT (OUTPATIENT)
Dept: PRIMARY CARE | Facility: CLINIC | Age: 51
End: 2026-08-05
Payer: COMMERCIAL